# Patient Record
Sex: FEMALE | URBAN - METROPOLITAN AREA
[De-identification: names, ages, dates, MRNs, and addresses within clinical notes are randomized per-mention and may not be internally consistent; named-entity substitution may affect disease eponyms.]

---

## 2022-11-01 ENCOUNTER — OFFICE VISIT (OUTPATIENT)
Dept: URBAN - METROPOLITAN AREA CLINIC 72 | Facility: CLINIC | Age: 67
End: 2022-11-01
Payer: COMMERCIAL

## 2022-11-01 VITALS
BODY MASS INDEX: 29.09 KG/M2 | SYSTOLIC BLOOD PRESSURE: 144 MMHG | DIASTOLIC BLOOD PRESSURE: 83 MMHG | HEART RATE: 76 BPM | HEIGHT: 63 IN | TEMPERATURE: 98 F | WEIGHT: 164.2 LBS

## 2022-11-01 DIAGNOSIS — R15.2 FECAL URGENCY: ICD-10-CM

## 2022-11-01 DIAGNOSIS — K90.89 BILE SALT-INDUCED DIARRHEA: ICD-10-CM

## 2022-11-01 PROCEDURE — 99204 OFFICE O/P NEW MOD 45 MIN: CPT | Performed by: INTERNAL MEDICINE

## 2022-11-01 RX ORDER — DIPHENOXYLATE HYDROCHLORIDE AND ATROPINE SULFATE 2.5; .025 MG/1; MG/1
1 TABLET AS NEEDED TABLET ORAL
Qty: 120 TABLET | Refills: 0 | OUTPATIENT
Start: 2022-11-01 | End: 2022-11-30

## 2022-11-01 RX ORDER — CHOLESTYRAMINE 4 G/9G
1 PACKET MIXED WITH WATER OR NON-CARBONATED DRINK POWDER, FOR SUSPENSION ORAL TWICE A DAY
Qty: 60 | OUTPATIENT
Start: 2022-11-01

## 2022-11-01 RX ORDER — BACILLUS COAGULANS/INULIN 1B-250 MG
AS DIRECTED CAPSULE ORAL
Status: ON HOLD | COMMUNITY

## 2022-11-01 NOTE — HPI-TODAY'S VISIT:
Mrs. Pacheco is a 67-year-old female presents as a new patient for consultation for fecal incontinence, she was referred by nurse practitioner Alexandra Woodard from the pelvic floor therapy at [facility], a copy this consultation will be forwarded to the referring provider. She reports over 20 years of incontinence after hemorrhoidectomy worsened after a cholecystectomy 5 years ago.  She has tried dietary and lifestyle modifications with no improvement of symptoms.  She is currently gluten-free and sugar-free.  She does take probiotics.  She had a colonoscopy in the past which did have colon polyp.She did have decreased sphincter tone on exam by the nurse practitioner.  She did have improvement with pelvic floor physical therapy.  She has been working with felt for physical therapy for improvement.  She describes no episodes of true incontinence but rather fecal urgency postprandially.  She has not had any episodes where she has defecated on herself.  She brings in a stool sample chart and notes that she has Ventura stool scale 5-7 stools frequently.  She has tried Imodium over-the-counter.  She has tried this month.  She had a colonoscopy last year with random biopsies that was unremarkable.  She believes matters worsened after cholecystectomy.  She does not recall she is ever been on WelChol.

## 2022-11-29 ENCOUNTER — ERX REFILL RESPONSE (OUTPATIENT)
Dept: URBAN - METROPOLITAN AREA CLINIC 72 | Facility: CLINIC | Age: 67
End: 2022-11-29

## 2022-11-29 RX ORDER — CHOLESTYRAMINE 4 G/9G
1 PACKET MIXED WITH WATER OR NON-CARBONATED DRINK POWDER, FOR SUSPENSION ORAL TWICE A DAY
Qty: 60 | OUTPATIENT

## 2022-11-29 RX ORDER — CHOLESTYRAMINE POWDER FOR SUSPENSION 4 G/8.78G
1 PACKET MIXED WITH WATER OR NON-CARBONATED DRINK ORALLY TWICE A DAY 30 DAY(S) POWDER, FOR SUSPENSION ORAL
Qty: 60 PACKET | Refills: 0 | OUTPATIENT

## 2022-12-14 ENCOUNTER — ERX REFILL RESPONSE (OUTPATIENT)
Dept: URBAN - METROPOLITAN AREA CLINIC 72 | Facility: CLINIC | Age: 67
End: 2022-12-14

## 2022-12-14 RX ORDER — CHOLESTYRAMINE 4 G/9G
MIX 1 PACKET WITH WATER OR NON-CARBONATED DRINK ORALLY TWICE A DAY 30 DAY(S) POWDER, FOR SUSPENSION ORAL
Qty: 180 PACKET | Refills: 1 | OUTPATIENT

## 2022-12-14 RX ORDER — CHOLESTYRAMINE POWDER FOR SUSPENSION 4 G/8.78G
1 PACKET MIXED WITH WATER OR NON-CARBONATED DRINK ORALLY TWICE A DAY 30 DAY(S) POWDER, FOR SUSPENSION ORAL
Qty: 60 PACKET | Refills: 0 | OUTPATIENT

## 2023-02-01 ENCOUNTER — OFFICE VISIT (OUTPATIENT)
Dept: URBAN - METROPOLITAN AREA CLINIC 72 | Facility: CLINIC | Age: 68
End: 2023-02-01
Payer: COMMERCIAL

## 2023-02-01 VITALS
TEMPERATURE: 97.7 F | WEIGHT: 165 LBS | HEIGHT: 63 IN | DIASTOLIC BLOOD PRESSURE: 88 MMHG | SYSTOLIC BLOOD PRESSURE: 142 MMHG | HEART RATE: 89 BPM | BODY MASS INDEX: 29.23 KG/M2

## 2023-02-01 DIAGNOSIS — Z86.010 PERSONAL HISTORY OF COLONIC POLYPS: ICD-10-CM

## 2023-02-01 DIAGNOSIS — K90.89 BILE SALT-INDUCED DIARRHEA: ICD-10-CM

## 2023-02-01 PROBLEM — 69980003: Status: ACTIVE | Noted: 2022-11-01

## 2023-02-01 PROBLEM — 428283002: Status: ACTIVE | Noted: 2023-02-01

## 2023-02-01 PROCEDURE — 99213 OFFICE O/P EST LOW 20 MIN: CPT | Performed by: NURSE PRACTITIONER

## 2023-02-01 RX ORDER — BACILLUS COAGULANS/INULIN 1B-250 MG
AS DIRECTED CAPSULE ORAL
Status: ON HOLD | COMMUNITY

## 2023-02-01 RX ORDER — CHOLESTYRAMINE 4 G/9G
MIX 1 PACKET WITH WATER OR NON-CARBONATED DRINK ORALLY TWICE A DAY 30 DAY(S) POWDER, FOR SUSPENSION ORAL
Qty: 180 PACKET | Refills: 1 | Status: ACTIVE | COMMUNITY

## 2023-02-01 RX ORDER — CHOLESTYRAMINE 4 G/9G
MIX 1 PACKET WITH WATER OR NON-CARBONATED DRINK ORALLY TWICE A DAY 30 DAY(S) POWDER, FOR SUSPENSION ORAL
Qty: 180 PACKET | Refills: 2

## 2023-02-01 NOTE — HPI-TODAY'S VISIT:
Mrs. Pacheco is a 67-year-old female here for a 3-month follow-up appointment for fecal incontinence.  Last seen 11/1/2022.  Cholestyramine prescribed.  If not tolerated can switch to colestipol or WelChol.  Lomotil as needed.  Consider Xifaxan.  May ultimately benefit from anorectal manometry for InterStim placement.  Has seen pelvic floor therapy at [facility] and denies that it helped her symptoms.     She reports over 20 years of incontinence after hemorrhoidectomy worsened after a cholecystectomy 5 years ago.  She has tried dietary and lifestyle modifications with no improvement of symptoms.  She is currently gluten-free and sugar-free.  She does take probiotics.    She had a colonoscopy 2021 with random biopsies that was unremarkable.    She has had a polyp in the past.  Lacy.    On interview today she is doing well.  No GI complaints.  She averages 2 formed BMs daily instead of 7.  No incontinence.  No melena, hematochezia or cramps.  She cut back on the cholestyramine to 1/2 dose.  She reports it is expensive and used [facility].

## 2023-07-07 ENCOUNTER — OFFICE VISIT (OUTPATIENT)
Dept: URBAN - METROPOLITAN AREA CLINIC 72 | Facility: CLINIC | Age: 68
End: 2023-07-07
Payer: COMMERCIAL

## 2023-07-07 VITALS
HEIGHT: 63 IN | TEMPERATURE: 97.3 F | DIASTOLIC BLOOD PRESSURE: 84 MMHG | BODY MASS INDEX: 29.95 KG/M2 | WEIGHT: 169 LBS | HEART RATE: 74 BPM | SYSTOLIC BLOOD PRESSURE: 144 MMHG | RESPIRATION RATE: 16 BRPM

## 2023-07-07 DIAGNOSIS — Z86.010 PERSONAL HISTORY OF COLONIC POLYPS: ICD-10-CM

## 2023-07-07 DIAGNOSIS — R19.7 ACUTE DIARRHEA: ICD-10-CM

## 2023-07-07 PROCEDURE — 99214 OFFICE O/P EST MOD 30 MIN: CPT | Performed by: NURSE PRACTITIONER

## 2023-07-07 RX ORDER — COLESTIPOL HYDROCHLORIDE 1 G/1
2 TABLETS TABLET, FILM COATED ORAL ONCE A DAY
Qty: 60 | Refills: 1 | OUTPATIENT
Start: 2023-07-07

## 2023-07-07 RX ORDER — CHOLESTYRAMINE 4 G/9G
MIX 1 PACKET WITH WATER OR NON-CARBONATED DRINK ORALLY TWICE A DAY 30 DAY(S) POWDER, FOR SUSPENSION ORAL
OUTPATIENT

## 2023-07-07 RX ORDER — BACILLUS COAGULANS/INULIN 1B-250 MG
AS DIRECTED CAPSULE ORAL
Status: ON HOLD | COMMUNITY

## 2023-07-07 RX ORDER — DICYCLOMINE HYDROCHLORIDE 20 MG/1
1 TABLET TABLET ORAL THREE TIMES A DAY
Qty: 90 | Refills: 1 | OUTPATIENT
Start: 2023-07-07 | End: 2023-09-05

## 2023-07-07 RX ORDER — CHOLESTYRAMINE 4 G/9G
MIX 1 PACKET WITH WATER OR NON-CARBONATED DRINK ORALLY TWICE A DAY 30 DAY(S) POWDER, FOR SUSPENSION ORAL
Qty: 180 PACKET | Refills: 2 | Status: ON HOLD | COMMUNITY

## 2023-07-07 NOTE — HPI-TODAY'S VISIT:
Mrs. Pacheco is a 68-year-old female here for follow-up appointment.    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  Cholestyramine was refilled.  She is taking half dose daily.  Advised she can try Benefiber if stools become loose or go to the full packet daily.  Last seen 11/1/2022.  Cholestyramine prescribed.  If not tolerated can switch to colestipol or WelChol.  Lomotil as needed.  Consider Xifaxan.  May ultimately benefit from anorectal manometry for InterStim placement.  Has seen pelvic floor therapy at [facility] and denies that it helped her symptoms.     She reports over 20 years of incontinence after hemorrhoidectomy worsened after a cholecystectomy 5 years ago.  She has tried dietary and lifestyle modifications with no improvement of symptoms.  She is currently gluten-free and sugar-free.  She does take probiotics.    She had a colonoscopy 2021 with random biopsies that was unremarkable.    She has had a polyp in the past.  Lacy.    On interview today the 1/2 dose cholestyramine is no longer controlling her diarrhea.  If she takes a full dose it will cause her to be full and uncomfortable.  Has 4 BM's a day with urgency.  Stools starts normal in the morning and formed and then becomes more loose throughout the day.  Benefiber in he past made her gassy.  She took an antibiotic Z-pack a couple of weeks ago. No improvement or worsening of symptoms.  No melena or hematochezia. No significant abcominal pain.  Weight stable from last appointment.  Lomotil didn't help.  Hasn't tried imodium.

## 2023-07-31 ENCOUNTER — ERX REFILL RESPONSE (OUTPATIENT)
Dept: URBAN - METROPOLITAN AREA CLINIC 72 | Facility: CLINIC | Age: 68
End: 2023-07-31

## 2023-07-31 RX ORDER — COLESTIPOL HYDROCHLORIDE 1 G/1
TAKE 2 TABLETS BY MOUTH EVERY DAY FOR 30 DAYS TABLET, FILM COATED ORAL
Qty: 60 TABLET | Refills: 1 | OUTPATIENT

## 2023-07-31 RX ORDER — DICYCLOMINE HYDROCHLORIDE 20 MG/1
TAKE 1 TABLET ORALLY THREE TIMES A DAY AS NEEDED FOR ABDOMINAL CRAMPING OR LOOSE STOOL TABLET ORAL
Qty: 90 TABLET | Refills: 1 | OUTPATIENT

## 2023-07-31 RX ORDER — DICYCLOMINE HYDROCHLORIDE 20 MG/1
1 TABLET TABLET ORAL THREE TIMES A DAY
Qty: 90 | Refills: 1 | OUTPATIENT

## 2023-07-31 RX ORDER — COLESTIPOL HYDROCHLORIDE 1 G/1
2 TABLETS TABLET, FILM COATED ORAL ONCE A DAY
Qty: 60 | Refills: 1 | OUTPATIENT

## 2023-08-08 ENCOUNTER — OFFICE VISIT (OUTPATIENT)
Dept: URBAN - METROPOLITAN AREA CLINIC 72 | Facility: CLINIC | Age: 68
End: 2023-08-08
Payer: COMMERCIAL

## 2023-08-08 VITALS
WEIGHT: 167.4 LBS | SYSTOLIC BLOOD PRESSURE: 122 MMHG | BODY MASS INDEX: 29.66 KG/M2 | HEIGHT: 63 IN | TEMPERATURE: 96.9 F | DIASTOLIC BLOOD PRESSURE: 70 MMHG | HEART RATE: 73 BPM

## 2023-08-08 DIAGNOSIS — Z86.010 PERSONAL HISTORY OF COLONIC POLYPS: ICD-10-CM

## 2023-08-08 DIAGNOSIS — K58.0 IRRITABLE BOWEL SYNDROME WITH DIARRHEA: ICD-10-CM

## 2023-08-08 PROBLEM — 197125005: Status: ACTIVE | Noted: 2023-08-08

## 2023-08-08 PROCEDURE — 99214 OFFICE O/P EST MOD 30 MIN: CPT | Performed by: NURSE PRACTITIONER

## 2023-08-08 RX ORDER — RIFAXIMIN 550 MG/1
1 TABLET TABLET ORAL THREE TIMES A DAY
Qty: 42 TABLET | Refills: 1 | OUTPATIENT
Start: 2023-08-08 | End: 2023-09-05

## 2023-08-08 RX ORDER — DICYCLOMINE HYDROCHLORIDE 20 MG/1
TAKE 1 TABLET ORALLY THREE TIMES A DAY AS NEEDED FOR ABDOMINAL CRAMPING OR LOOSE STOOL TABLET ORAL
Qty: 90 TABLET | Refills: 1 | Status: ACTIVE | COMMUNITY

## 2023-08-08 RX ORDER — COLESTIPOL HYDROCHLORIDE 1 G/1
TAKE 2 TABLETS BY MOUTH EVERY DAY FOR 30 DAYS TABLET, FILM COATED ORAL
Qty: 60 TABLET | Refills: 1 | Status: ACTIVE | COMMUNITY

## 2023-08-08 RX ORDER — BACILLUS COAGULANS/INULIN 1B-250 MG
AS DIRECTED CAPSULE ORAL
Status: ON HOLD | COMMUNITY

## 2023-08-08 NOTE — HPI-TODAY'S VISIT:
68-year-old female here for follow-up appointment.   Last seen 7/7/2023 for follow-up appointment for diarrhea.  Started on dicyclomine, previously did not tolerate the cholestyramine so prescribed colestipol. Low FODMAP diet to identify food triggers and stool Q DX kit sent home.  Benefiber to bulk stools.  Consider Xifaxan for IBS diarrhea Imodium as needed. In a previous visit it was mentioned that she may ultimately benefit from anorectal manometry for InterStim placement.  Has seen pelvic floor therapy at [facility] and denies that it helped her symptoms.     Stool studies 7/12/2023.  No ova or parasites.  Normal fecal [place] and fecal pancreatic elastase.  PCR negative.   She reports over 20 years of incontinence after hemorrhoidectomy worsened after a cholecystectomy 5 years ago.  She has tried dietary and lifestyle modifications with no improvement of symptoms.  She is currently gluten-free and sugar-free.  She does take probiotics.    She had a colonoscopy 2021 with random biopsies that was unremarkable.    She has had a polyp in the past.  Lacy.    On interview symptoms are improved slighty with the colestipol one tablet daily.  She is going to start taking 2 tablets.  Dicyclomine didn't help. She has not started benefiber. No melena or hematochezia. No significant abcominal pain.  Weight stable from last appointment.  Lomotil didn't help.  Hasn't tried imodium.

## 2023-08-09 ENCOUNTER — TELEPHONE ENCOUNTER (OUTPATIENT)
Dept: URBAN - METROPOLITAN AREA CLINIC 72 | Facility: CLINIC | Age: 68
End: 2023-08-09

## 2023-08-09 RX ORDER — DICYCLOMINE HYDROCHLORIDE 20 MG/1
TAKE 1 TABLET ORALLY THREE TIMES A DAY AS NEEDED FOR ABDOMINAL CRAMPING OR LOOSE STOOL TABLET ORAL
Qty: 90 TABLET | Refills: 1 | Status: ACTIVE | COMMUNITY

## 2023-08-09 RX ORDER — COLESTIPOL HYDROCHLORIDE 1 G/1
TAKE 2 TABLETS BY MOUTH EVERY DAY FOR 30 DAYS TABLET, FILM COATED ORAL
Qty: 60 TABLET | Refills: 1 | Status: ACTIVE | COMMUNITY

## 2023-08-09 RX ORDER — RIFAXIMIN 550 MG/1
1 TABLET TABLET ORAL THREE TIMES A DAY
Qty: 42 TABLET | Refills: 1 | Status: ACTIVE | COMMUNITY
Start: 2023-08-08 | End: 2023-09-05

## 2023-08-09 RX ORDER — METRONIDAZOLE 250 MG/1
1 TABLET TABLET ORAL THREE TIMES A DAY
Qty: 30 | Refills: 0 | OUTPATIENT
Start: 2023-08-14 | End: 2023-08-24

## 2023-08-09 RX ORDER — BACILLUS COAGULANS/INULIN 1B-250 MG
AS DIRECTED CAPSULE ORAL
Status: ON HOLD | COMMUNITY

## 2023-08-14 ENCOUNTER — ERX REFILL RESPONSE (OUTPATIENT)
Dept: URBAN - METROPOLITAN AREA CLINIC 72 | Facility: CLINIC | Age: 68
End: 2023-08-14

## 2023-08-14 RX ORDER — COLESTIPOL HYDROCHLORIDE 1 G/1
TAKE 2 TABLETS BY MOUTH EVERY DAY FOR 30 DAYS TABLET, FILM COATED ORAL
Qty: 60 TABLET | Refills: 1 | OUTPATIENT

## 2023-08-14 RX ORDER — COLESTIPOL HYDROCHLORIDE 1 G/1
TAKE 2 TABLETS BY MOUTH EVERY DAY TABLET, FILM COATED ORAL
Qty: 180 TABLET | Refills: 1 | OUTPATIENT

## 2023-08-14 RX ORDER — DICYCLOMINE HYDROCHLORIDE 20 MG/1
TAKE 1 TABLET ORALLY THREE TIMES A DAY AS NEEDED FOR ABDOMINAL CRAMPING OR LOOSE STOOL 30 TABLET ORAL
Qty: 270 TABLET | Refills: 1 | OUTPATIENT

## 2023-08-14 RX ORDER — DICYCLOMINE HYDROCHLORIDE 20 MG/1
TAKE 1 TABLET ORALLY THREE TIMES A DAY AS NEEDED FOR ABDOMINAL CRAMPING OR LOOSE STOOL TABLET ORAL
Qty: 90 TABLET | Refills: 1 | OUTPATIENT

## 2023-11-01 ENCOUNTER — OFFICE VISIT (OUTPATIENT)
Dept: URBAN - METROPOLITAN AREA CLINIC 72 | Facility: CLINIC | Age: 68
End: 2023-11-01

## 2023-11-01 RX ORDER — DICYCLOMINE HYDROCHLORIDE 20 MG/1
TAKE 1 TABLET ORALLY THREE TIMES A DAY AS NEEDED FOR ABDOMINAL CRAMPING OR LOOSE STOOL 30 TABLET ORAL
Qty: 270 TABLET | Refills: 1 | Status: ACTIVE | COMMUNITY

## 2023-11-01 RX ORDER — BACILLUS COAGULANS/INULIN 1B-250 MG
AS DIRECTED CAPSULE ORAL
Status: ON HOLD | COMMUNITY

## 2023-11-01 RX ORDER — COLESTIPOL HYDROCHLORIDE 1 G/1
TAKE 2 TABLETS BY MOUTH EVERY DAY TABLET, FILM COATED ORAL
Qty: 180 TABLET | Refills: 1 | Status: ACTIVE | COMMUNITY

## 2023-11-14 ENCOUNTER — DASHBOARD ENCOUNTERS (OUTPATIENT)
Age: 68
End: 2023-11-14

## 2023-11-14 ENCOUNTER — OFFICE VISIT (OUTPATIENT)
Dept: URBAN - METROPOLITAN AREA CLINIC 72 | Facility: CLINIC | Age: 68
End: 2023-11-14
Payer: COMMERCIAL

## 2023-11-14 VITALS
WEIGHT: 165 LBS | DIASTOLIC BLOOD PRESSURE: 91 MMHG | BODY MASS INDEX: 29.23 KG/M2 | SYSTOLIC BLOOD PRESSURE: 154 MMHG | HEART RATE: 81 BPM | HEIGHT: 63 IN | TEMPERATURE: 96.9 F

## 2023-11-14 DIAGNOSIS — K58.0 IRRITABLE BOWEL SYNDROME WITH DIARRHEA: ICD-10-CM

## 2023-11-14 DIAGNOSIS — Z86.010 PERSONAL HISTORY OF COLONIC POLYPS: ICD-10-CM

## 2023-11-14 PROCEDURE — 99213 OFFICE O/P EST LOW 20 MIN: CPT | Performed by: NURSE PRACTITIONER

## 2023-11-14 RX ORDER — COLESTIPOL HYDROCHLORIDE 1 G/1
TAKE 2 TABLETS BY MOUTH EVERY DAY TABLET, FILM COATED ORAL
Qty: 180 TABLET | Refills: 1 | Status: ON HOLD | COMMUNITY

## 2023-11-14 RX ORDER — DICYCLOMINE HYDROCHLORIDE 20 MG/1
TAKE 1 TABLET ORALLY THREE TIMES A DAY AS NEEDED FOR ABDOMINAL CRAMPING OR LOOSE STOOL 30 TABLET ORAL
Qty: 270 TABLET | Refills: 1 | Status: ON HOLD | COMMUNITY

## 2023-11-14 RX ORDER — BACILLUS COAGULANS/INULIN 1B-250 MG
AS DIRECTED CAPSULE ORAL
Status: ON HOLD | COMMUNITY

## 2023-11-14 NOTE — HPI-OTHER HISTORIES
Stool studies 7/12/2023.  Fecal [place] normal.  Fecal pancreatic elastase normal.  PCR negative.  She had a colonoscopy 2021 with random biopsies that was unremarkable. She has had a polyp in the past. Lacy.

## 2023-11-14 NOTE — HPI-TODAY'S VISIT:
68-year-old female here for follow-up appointment.   Last seen 8/8/2023 for IBS diarrhea started on Xifaxan. Imodium as needed.  Cholestyramine was not controlling her symptoms. Xifaxan eneded up being too expensive she was prescribed metronidazole.   In a previous visit it was mentioned that she may ultimately benefit from anorectal manometry for InterStim placement.  Has seen pelvic floor therapy at [facility] and denies that it helped her symptoms.     She reports over 20 years of incontinence after hemorrhoidectomy worsened after a cholecystectomy 5 years ago.  She has tried dietary and lifestyle modifications with no improvement of symptoms.  She is currently gluten-free and sugar-free.  She does take probiotics.    On interview is doing well on Imodium every other day.  No further leakage.  She did not feel metronidazole helped.  Dicyclomine didn't help. No melena or hematochezia. No significant abcominal pain.  Weight stable from last appointment.  Lomotil didn't help.